# Patient Record
Sex: MALE | Race: OTHER
[De-identification: names, ages, dates, MRNs, and addresses within clinical notes are randomized per-mention and may not be internally consistent; named-entity substitution may affect disease eponyms.]

---

## 2017-04-02 ENCOUNTER — HOSPITAL ENCOUNTER (EMERGENCY)
Dept: HOSPITAL 62 - ER | Age: 37
Discharge: HOME | End: 2017-04-02
Payer: SELF-PAY

## 2017-04-02 VITALS — SYSTOLIC BLOOD PRESSURE: 128 MMHG | DIASTOLIC BLOOD PRESSURE: 82 MMHG

## 2017-04-02 DIAGNOSIS — S91.332A: Primary | ICD-10-CM
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
## 2017-04-02 DIAGNOSIS — F17.200: ICD-10-CM

## 2017-04-02 DIAGNOSIS — L03.116: ICD-10-CM

## 2017-04-02 DIAGNOSIS — M79.89: ICD-10-CM

## 2017-04-02 DIAGNOSIS — M79.672: ICD-10-CM

## 2017-04-02 LAB
ALBUMIN SERPL-MCNC: 4.1 G/DL (ref 3.5–5)
ALP SERPL-CCNC: 75 U/L (ref 38–126)
ALT SERPL-CCNC: 29 U/L (ref 21–72)
ANION GAP SERPL CALC-SCNC: 12 MMOL/L (ref 5–19)
AST SERPL-CCNC: 18 U/L (ref 17–59)
BASOPHILS # BLD AUTO: 0.1 10^3/UL (ref 0–0.2)
BASOPHILS NFR BLD AUTO: 0.8 % (ref 0–2)
BILIRUB DIRECT SERPL-MCNC: 0.3 MG/DL (ref 0–0.4)
BILIRUB SERPL-MCNC: 0.4 MG/DL (ref 0.2–1.3)
BUN SERPL-MCNC: 20 MG/DL (ref 7–20)
CALCIUM: 9.7 MG/DL (ref 8.4–10.2)
CHLORIDE SERPL-SCNC: 105 MMOL/L (ref 98–107)
CO2 SERPL-SCNC: 26 MMOL/L (ref 22–30)
CREAT SERPL-MCNC: 0.84 MG/DL (ref 0.52–1.25)
EOSINOPHIL # BLD AUTO: 0.2 10^3/UL (ref 0–0.6)
EOSINOPHIL NFR BLD AUTO: 2.1 % (ref 0–6)
ERYTHROCYTE [DISTWIDTH] IN BLOOD BY AUTOMATED COUNT: 13.4 % (ref 11.5–14)
GLUCOSE SERPL-MCNC: 95 MG/DL (ref 75–110)
HCT VFR BLD CALC: 43.2 % (ref 37.9–51)
HGB BLD-MCNC: 14.7 G/DL (ref 13.5–17)
HGB HCT DIFFERENCE: 0.9
LYMPHOCYTES # BLD AUTO: 3 10^3/UL (ref 0.5–4.7)
LYMPHOCYTES NFR BLD AUTO: 27.4 % (ref 13–45)
MCH RBC QN AUTO: 30.8 PG (ref 27–33.4)
MCHC RBC AUTO-ENTMCNC: 34 G/DL (ref 32–36)
MCV RBC AUTO: 91 FL (ref 80–97)
MONOCYTES # BLD AUTO: 0.9 10^3/UL (ref 0.1–1.4)
MONOCYTES NFR BLD AUTO: 8.1 % (ref 3–13)
NEUTROPHILS # BLD AUTO: 6.7 10^3/UL (ref 1.7–8.2)
NEUTS SEG NFR BLD AUTO: 61.6 % (ref 42–78)
POTASSIUM SERPL-SCNC: 4.9 MMOL/L (ref 3.6–5)
PROT SERPL-MCNC: 6.9 G/DL (ref 6.3–8.2)
RBC # BLD AUTO: 4.77 10^6/UL (ref 4.35–5.55)
SODIUM SERPL-SCNC: 143.3 MMOL/L (ref 137–145)
WBC # BLD AUTO: 10.9 10^3/UL (ref 4–10.5)

## 2017-04-02 PROCEDURE — 96367 TX/PROPH/DG ADDL SEQ IV INF: CPT

## 2017-04-02 PROCEDURE — 96365 THER/PROPH/DIAG IV INF INIT: CPT

## 2017-04-02 PROCEDURE — 36415 COLL VENOUS BLD VENIPUNCTURE: CPT

## 2017-04-02 PROCEDURE — 0H9NXZZ DRAINAGE OF LEFT FOOT SKIN, EXTERNAL APPROACH: ICD-10-PCS

## 2017-04-02 PROCEDURE — 96375 TX/PRO/DX INJ NEW DRUG ADDON: CPT

## 2017-04-02 PROCEDURE — 90471 IMMUNIZATION ADMIN: CPT

## 2017-04-02 PROCEDURE — 80053 COMPREHEN METABOLIC PANEL: CPT

## 2017-04-02 PROCEDURE — 90715 TDAP VACCINE 7 YRS/> IM: CPT

## 2017-04-02 PROCEDURE — 73630 X-RAY EXAM OF FOOT: CPT

## 2017-04-02 PROCEDURE — 87040 BLOOD CULTURE FOR BACTERIA: CPT

## 2017-04-02 PROCEDURE — 96372 THER/PROPH/DIAG INJ SC/IM: CPT

## 2017-04-02 PROCEDURE — 10060 I&D ABSCESS SIMPLE/SINGLE: CPT

## 2017-04-02 PROCEDURE — 99284 EMERGENCY DEPT VISIT MOD MDM: CPT

## 2017-04-02 PROCEDURE — 85025 COMPLETE CBC W/AUTO DIFF WBC: CPT

## 2017-04-02 NOTE — ER DOCUMENT REPORT
ED Wound





- General


Chief Complaint: Puncture Wound to Foot


Stated Complaint: LEFT FOOT INJURY


Mode of Arrival: Ambulatory


Information source: Patient


Notes: 


Patient states that he stepped on a nail that went through his shoe, through 

his sock into his foot 5 days ago.  Patient states that he pulled the nail out 

of his foot by using the claw of his hammer.  Patient states since then he is 

gradually developed increasing left foot pain, swelling and noticed redness to 

the top of his foot yesterday.  Patient denies any history of diabetes.





- HPI


Patient complains to provider of: Puncture wound


Onset/Duration: Worse


Quality of pain: Achy


Pain Level: 3


Skin Color: Erythema


Associated Symptoms: Redness, Swelling





- Related Data


Allergies/Adverse Reactions: 


 





Sulfa (Sulfonamide Antibiotics) Allergy (Unknown, Verified 04/02/17 02:53)


 











Past Medical History





- General


Information source: Patient





- Social History


Smoking Status: Current Every Day Smoker


Frequency of alcohol use: None


Drug Abuse: None


Occupation: construction


Lives with: Family


Family History: None





- Past Medical History


Cardiac Medical History: Reports: Hx Hypertension


Neurological Medical History: Reports: Hx Seizures


Renal/ Medical History: Denies: Hx Peritoneal Dialysis


Traumatic Medical History: Reports: Hx Fractures - right shoulder, neuropathy sx


Past Surgical History: Reports: Hx Orthopedic Surgery - R shoulder, Hx 

Tonsillectomy





- Immunizations


Hx Diphtheria, Pertussis, Tetanus Vaccination: Yes





Review of Systems





- Review of Systems


Constitutional: No symptoms reported.  denies: Fever, Recent illness


EENT: No symptoms reported


Cardiovascular: No symptoms reported


Respiratory: No symptoms reported


Gastrointestinal: No symptoms reported


Genitourinary: No symptoms reported


Male Genitourinary: No symptoms reported


Musculoskeletal: Other - Left foot pain and swelling


Skin: Change in color - Faint redness to dorsal aspect of left foot


Hematologic/Lymphatic: No symptoms reported


Neurological/Psychological: No symptoms reported





Physical Exam





- Vital signs


Vitals: 


 











Temp Pulse Resp BP Pulse Ox


 


 98.2 F   93   18   144/93 H  100 


 


 04/02/17 02:53  04/02/17 02:53  04/02/17 02:53  04/02/17 02:53  04/02/17 02:53














- General


General appearance: Appears well, Alert


In distress: None





- HEENT


Head: Normocephalic, Atraumatic


Eyes: Normal


Nasal: Normal


Mouth/Lips: Normal


Mucous membranes: Normal


Neck: Normal, Supple.  No: Lymphadenopathy





- Respiratory


Respiratory status: No respiratory distress


Chest status: Nontender


Breath sounds: Normal.  No: Rales, Rhonchi, Stridor, Wheezing


Chest palpation: Normal





- Cardiovascular


Rhythm: Regular


Heart sounds: S1 appreciated, S2 appreciated


Murmur: No





- Back


Back: Normal





- Extremities


General upper extremity: Normal inspection, Normal strength


General lower extremity: Tender - Left foot tenderness, Normal strength


Ankle: Normal, Nontender


Foot: Tender - Tenderness to plantar surface of left foot, Edema, Other - 1+ 

edema to left foot faint erythematous dorsal aspect to left foot overlying left 

fourth and fifth metatarsals


Notes: 


Patient with plantar puncture wound to foot near fourth metatarsal





- Neurological


Neuro grossly intact: Yes


Cognition: Normal


Ace Coma Scale Eye Opening: Spontaneous


Ace Coma Scale Verbal: Oriented


[place] Coma Scale Motor: Obeys Commands


[place] Coma Scale Total: 15





- Psychological


Associated symptoms: Normal affect, Normal mood





- Skin


Skin Temperature: Warm


Skin Moisture: Dry


Skin Color: Erythema - Dorsal aspect of left foot


Irregularity with: Swelling, Tenderness





Course





- Re-evaluation


Re-evalutation: 





04/02/17 07:41


Consulted with Dr. Hidalgo regarding patient management.  Recommends incision, 

exploration of puncture wound to be certain no retained foreign body





- Vital Signs


Vital signs: 


 











Temp Pulse Resp BP Pulse Ox


 


 98.2 F   80   18   128/82 H  98 


 


 04/02/17 11:00  04/02/17 11:00  04/02/17 11:00  04/02/17 11:00  04/02/17 11:00














- Laboratory


Result Diagrams: 


 04/02/17 07:55





 04/02/17 07:55


Laboratory results interpreted by me: 


 











  04/02/17





  07:55


 


WBC  10.9 H











 Labs- Entire Visit











  04/02/17 04/02/17





  07:55 07:55


 


WBC  10.9 H 


 


RBC  4.77 


 


Hgb  14.7 


 


Hct  43.2 


 


MCV  91 


 


MCH  30.8 


 


MCHC  34.0 


 


RDW  13.4 


 


Plt Count  261 


 


Seg Neutrophils %  61.6 


 


Lymphocytes %  27.4 


 


Monocytes %  8.1 


 


Eosinophils %  2.1 


 


Basophils %  0.8 


 


Absolute Neutrophils  6.7 


 


Absolute Lymphocytes  3.0 


 


Absolute Monocytes  0.9 


 


Absolute Eosinophils  0.2 


 


Absolute Basophils  0.1 


 


Sodium   143.3


 


Potassium   4.9


 


Chloride   105


 


Carbon Dioxide   26


 


Anion Gap   12


 


BUN   20


 


Creatinine   0.84


 


Est GFR ( Amer)   > 60


 


Est GFR (Non-Af Amer)   > 60


 


Glucose   95


 


Calcium   9.7


 


Total Bilirubin   0.4


 


Direct Bilirubin   0.3


 


Indirect Bilirubin   Not Reportable


 


Neonat Total Bilirubin   Not Reportable


 


AST   18


 


ALT   29


 


Alkaline Phosphatase   75


 


Total Protein   6.9


 


Albumin   4.1














- Diagnostic Test


Radiology reviewed: Reports reviewed





Procedures





- Incision and Drainage


  ** Left Foot


Type: Simple


Anesthetic type: 1% Lidocaine


Blade size: 11


I&D procedure: Betadine prep applied


Incision Method: Incision made by scalpel


Notes: 





04/02/17 10:12


Puncture wound opened up with scalpel, small amount of dark matter removed from 

wound, no purulent drainage.  Patient tolerated procedure well





Discharge





- Discharge


Clinical Impression: 


Puncture wound of plantar aspect of foot


Qualifiers:


 Encounter type: initial encounter Laterality: left Qualified Code(s): S91.332A 

- Puncture wound without foreign body, left foot, initial encounter





Cellulitis


Qualifiers:


 Site of cellulitis: extremity Site of cellulitis of extremity: lower extremity 

Laterality: left Qualified Code(s): L03.116 - Cellulitis of left lower limb





Condition: Stable


Disposition: HOME, SELF-CARE


Instructions:  Tetanus Immunization Given ([facility]), Prophylactic Antibiotic ([facility]), 

Oral Narcotic Medication ([facility]), Antibiotic Ointment Protection ([facility]), Soap 

Cleansing ([facility]), Cellulitis ([facility]), IV Antibiotics ([facility]), Use of Crutches ([facility])


Additional Instructions: 


Return immediately for any new or worsening symptoms, increased pain, redness, 

fever, or any concerning symptoms





Followup with orthopedic doctor, call their office tomorrow for a follow-up 

appointment





Keep wound covered as it continues to heal


Prescriptions: 


Cephalexin Monohydrate [Keflex 500 mg Capsule] 500 mg PO Q6H 5 Days


Levofloxacin [Levaquin 750 mg Tablet] 750 mg PO DAILY #4 tablet


Oxycodone HCl/Acetaminophen [Percocet 5-325 mg Tablet] 1 tab PO ASDIR PRN #15 

tablet


 PRN Reason: 


Forms:  Return to Work


Referrals: 


SEVERO SHIRLEY MD [ACTIVE STAFF] - Follow up tomorrow


[facility] [Provider Group] - Follow up tomorrow

## 2019-09-28 ENCOUNTER — HOSPITAL ENCOUNTER (EMERGENCY)
Dept: HOSPITAL 62 - ER | Age: 39
Discharge: HOME | End: 2019-09-28
Payer: SELF-PAY

## 2019-09-28 VITALS — SYSTOLIC BLOOD PRESSURE: 156 MMHG | DIASTOLIC BLOOD PRESSURE: 99 MMHG

## 2019-09-28 DIAGNOSIS — S61.235A: Primary | ICD-10-CM

## 2019-09-28 DIAGNOSIS — I10: ICD-10-CM
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
## 2019-09-28 LAB
ADD MANUAL DIFF: NO
ALBUMIN SERPL-MCNC: 4.3 G/DL (ref 3.5–5)
ALP SERPL-CCNC: 63 U/L (ref 38–126)
ANION GAP SERPL CALC-SCNC: 10 MMOL/L (ref 5–19)
AST SERPL-CCNC: 22 U/L (ref 17–59)
BASOPHILS # BLD AUTO: 0.1 10^3/UL (ref 0–0.2)
BASOPHILS NFR BLD AUTO: 1.2 % (ref 0–2)
BILIRUB DIRECT SERPL-MCNC: 0.1 MG/DL (ref 0–0.4)
BILIRUB SERPL-MCNC: 0.3 MG/DL (ref 0.2–1.3)
BUN SERPL-MCNC: 16 MG/DL (ref 7–20)
CALCIUM: 9.6 MG/DL (ref 8.4–10.2)
CHLORIDE SERPL-SCNC: 104 MMOL/L (ref 98–107)
CO2 SERPL-SCNC: 27 MMOL/L (ref 22–30)
EOSINOPHIL # BLD AUTO: 0.1 10^3/UL (ref 0–0.6)
EOSINOPHIL NFR BLD AUTO: 1.8 % (ref 0–6)
ERYTHROCYTE [DISTWIDTH] IN BLOOD BY AUTOMATED COUNT: 13.6 % (ref 11.5–14)
GLUCOSE SERPL-MCNC: 96 MG/DL (ref 75–110)
HCT VFR BLD CALC: 44.3 % (ref 37.9–51)
HGB BLD-MCNC: 15.3 G/DL (ref 13.5–17)
LYMPHOCYTES # BLD AUTO: 2.4 10^3/UL (ref 0.5–4.7)
LYMPHOCYTES NFR BLD AUTO: 31.4 % (ref 13–45)
MCH RBC QN AUTO: 31.2 PG (ref 27–33.4)
MCHC RBC AUTO-ENTMCNC: 34.5 G/DL (ref 32–36)
MCV RBC AUTO: 91 FL (ref 80–97)
MONOCYTES # BLD AUTO: 0.9 10^3/UL (ref 0.1–1.4)
MONOCYTES NFR BLD AUTO: 12.1 % (ref 3–13)
NEUTROPHILS # BLD AUTO: 4 10^3/UL (ref 1.7–8.2)
NEUTS SEG NFR BLD AUTO: 53.5 % (ref 42–78)
PLATELET # BLD: 274 10^3/UL (ref 150–450)
POTASSIUM SERPL-SCNC: 4.4 MMOL/L (ref 3.6–5)
PROT SERPL-MCNC: 7.4 G/DL (ref 6.3–8.2)
RBC # BLD AUTO: 4.9 10^6/UL (ref 4.35–5.55)
TOTAL CELLS COUNTED % (AUTO): 100 %
WBC # BLD AUTO: 7.6 10^3/UL (ref 4–10.5)

## 2019-09-28 PROCEDURE — 86701 HIV-1ANTIBODY: CPT

## 2019-09-28 PROCEDURE — 80074 ACUTE HEPATITIS PANEL: CPT

## 2019-09-28 PROCEDURE — 36415 COLL VENOUS BLD VENIPUNCTURE: CPT

## 2019-09-28 PROCEDURE — 90371 HEP B IG IM: CPT

## 2019-09-28 PROCEDURE — 90746 HEPB VACCINE 3 DOSE ADULT IM: CPT

## 2019-09-28 PROCEDURE — 80053 COMPREHEN METABOLIC PANEL: CPT

## 2019-09-28 PROCEDURE — 96374 THER/PROPH/DIAG INJ IV PUSH: CPT

## 2019-09-28 PROCEDURE — 99283 EMERGENCY DEPT VISIT LOW MDM: CPT

## 2019-09-28 PROCEDURE — 85025 COMPLETE CBC W/AUTO DIFF WBC: CPT

## 2019-09-28 NOTE — ER DOCUMENT REPORT
HPI





- HPI


Time Seen by Provider: 09/28/19 15:01


Pain Level: Denies


Context: 





Patient is a 38-year-old male presents emergency department with a chief 

complaint of needlestick.  Patient states he was helping a friend clean out a 

trailer has a tendency to just moved out when he was taking out the trash and 

was stuck with a needle to the left fourth finger.  Patient reports that his 

hepatitis vaccine is not up-to-date but would like to be treated for HIV and 

hepatitis B.  Patient denies any other complaints.





Past Medical History





- General


Information source: Patient





- Social History


Smoking Status: Unknown if Ever Smoked


Frequency of alcohol use: None


Drug Abuse: None


Lives with: Family


Family History: None


Patient has suicidal ideation: No


Patient has homicidal ideation: No





- Past Medical History


Cardiac Medical History: Reports: Hx Hypertension


Pulmonary Medical History: Reports: None


EENT Medical History: Reports: None


Neurological Medical History: Reports: Hx Seizures


Endocrine Medical History: Reports: None


Renal/ Medical History: Reports: None.  Denies: Hx Peritoneal Dialysis


Malignancy Medical History: Reports None


GI Medical History: Reports: None


Musculoskeletal Medical History: Reports None


Skin Medical History: Reports None


Psychiatric Medical History: Reports: None


Traumatic Medical History: Reports: Hx Fractures - right shoulder, neuropathy sx


Infectious Medical History: Reports: None


Past Surgical History: Reports: Hx Orthopedic Surgery - R shoulder, Hx 

Tonsillectomy





- Immunizations


Hx Diphtheria, Pertussis, Tetanus Vaccination: Yes





Vertical Provider Document





- CONSTITUTIONAL


Agree With Documented VS: Yes


Exam Limitations: No Limitations


General Appearance: No Apparent Distress





- HEENT


HEENT: Atraumatic, Normocephalic, PERRLA





- NECK


Neck: Normal Inspection





- RESPIRATORY


Respiratory: Breath Sounds Normal, No Respiratory Distress





- CARDIOVASCULAR


Cardiovascular: Regular Rate, Regular Rhythm





- GI/ABDOMEN


Gastrointestinal: Abdomen Soft, Abdomen Non-Tender





- NEURO


Level of Consciousness: Awake, Alert, Appropriate





- DERM


Integumentary: Warm, Dry, No Rash





Course





- Re-evaluation


Re-evalutation: 





09/28/19 19:29


Hepatitis panel and medications have been probably ordered.  Patient giving 

first dose of antivirals for 3 days.  Will prescribe a 25-day dose of both 

these.  I did explain to the patient he is to follow-up with the health 

department for additional HIV testing and to receive the rest of the hep B vacci

garcia.  Patient verbalized understanding.





- Vital Signs


Vital signs: 


                                        











Temp Pulse Resp BP Pulse Ox


 


 98.1 F   97   14   156/99 H  97 


 


 09/28/19 14:27  09/28/19 14:27  09/28/19 14:27  09/28/19 14:27  09/28/19 14:27














- Laboratory


Result Diagrams: 


                                 09/28/19 15:30





                                 09/28/19 15:30





Discharge





- Discharge


Clinical Impression: 


Needle stick injury of finger


Qualifiers:


 Encounter type: initial encounter Qualified Code(s): S61.239A - Puncture wound 

without foreign body of unspecified finger without damage to nail, initial 

encounter; W27.3XXA - Contact with needle (sewing), initial encounter





Condition: Stable


Disposition: HOME, SELF-CARE


Additional Instructions: 


Today you are seen in the emergency department for needlestick exposure.  You 

have been tested for HIV and hepatitis B.  We have given you the hepatitis B 

vaccine and immunoglobulin but you will require additional doses.  You can go to

the health department for further follow-up.  You also been given a 28-day 

course of antivirals.  You do need to follow-up with them to have additional HIV

testing over the course of a specific time.  Please return to the emergency 

department if you have any concerning signs or symptoms.


Prescriptions: 


Raltegravir Potassium [Isentress 400 mg Tablet] 400 mg PO BID 25 Days #50 tablet


Emtricitabine/Tenofovir (Tdf) [Truvada 100 mg-150 mg Tablet] 2 tab PO DAILY 25 

Days #50 tablet

## 2019-09-28 NOTE — ER DOCUMENT REPORT
ED Medical Screen (RME)





- General


Chief Complaint: Needle Stick Exposure


Stated Complaint: NEEDLE STICK


Time Seen by Provider: 09/28/19 15:01


Mode of Arrival: Ambulatory


Information source: Patient


Notes: 





Patient reports needlestick exposure to the left fourth finger.  Patient was 

cleaning out with a trailer for a friend after attendance moved out.  Patient 

does not know any information about whose needle it may be.  Patient denies any 

history of hepatitis B vaccination.  Patient would like to be tested and treated

for HIV postexposure.  Patient does report his tetanus immunizations up-to-date.





I have greeted and performed a rapid initial assessment of this patient.  A 

comprehensive ED assessment and evaluation of the patient, analysis of test 

results and completion of the medical decision making process will be conducted 

by additional ED providers.





- Related Data


Allergies/Adverse Reactions: 


                                        





Sulfa (Sulfonamide Antibiotics) Allergy (Unknown, Verified 09/28/19 14:58)


   











Past Medical History





- Past Medical History


Cardiac Medical History: Reports: Hx Hypertension


Neurological Medical History: Reports: Hx Seizures


Renal/ Medical History: Denies: Hx Peritoneal Dialysis


Traumatic Medical History: Reports: Hx Fractures - right shoulder, neuropathy sx


Past Surgical History: Reports: Hx Orthopedic Surgery - R shoulder, Hx 

Tonsillectomy





- Immunizations


Hx Diphtheria, Pertussis, Tetanus Vaccination: Yes





Physical Exam





- Vital signs


Vitals: 





                                        











Temp Pulse Resp BP Pulse Ox


 


 98.1 F   97   14   156/99 H  97 


 


 09/28/19 14:27 09/28/19 14:27 09/28/19 14:27 09/28/19 14:27 09/28/19 14:27














- General


General appearance: Alert, Anxious


Notes: 





Left fourth finger puncture wound





Course





- Vital Signs


Vital signs: 





                                        











Temp Pulse Resp BP Pulse Ox


 


 98.1 F   97   14   156/99 H  97 


 


 09/28/19 14:27  09/28/19 14:27  09/28/19 14:27 09/28/19 14:27 09/28/19 14:27

## 2019-09-30 LAB — HEPATITIS C VIRUS ANTIBODY: <0.1 S/CO RATIO (ref 0–0.9)

## 2020-08-28 ENCOUNTER — HOSPITAL ENCOUNTER (EMERGENCY)
Dept: HOSPITAL 62 - ER | Age: 40
Discharge: LEFT BEFORE DISCHARGE | End: 2020-08-28
Payer: SELF-PAY

## 2020-08-28 VITALS — DIASTOLIC BLOOD PRESSURE: 90 MMHG | SYSTOLIC BLOOD PRESSURE: 145 MMHG

## 2020-08-28 DIAGNOSIS — N43.3: ICD-10-CM

## 2020-08-28 DIAGNOSIS — R10.30: ICD-10-CM

## 2020-08-28 DIAGNOSIS — N50.82: Primary | ICD-10-CM

## 2020-08-28 DIAGNOSIS — Z88.2: ICD-10-CM

## 2020-08-28 DIAGNOSIS — I86.1: ICD-10-CM

## 2020-08-28 DIAGNOSIS — I10: ICD-10-CM

## 2020-08-28 DIAGNOSIS — Z53.20: ICD-10-CM

## 2020-08-28 LAB
ADD MANUAL DIFF: NO
ALBUMIN SERPL-MCNC: 4.2 G/DL (ref 3.5–5)
ALP SERPL-CCNC: 65 U/L (ref 38–126)
ANION GAP SERPL CALC-SCNC: 8 MMOL/L (ref 5–19)
APPEARANCE UR: CLEAR
APTT PPP: (no result) S
AST SERPL-CCNC: 20 U/L (ref 17–59)
BASOPHILS # BLD AUTO: 0.1 10^3/UL (ref 0–0.2)
BASOPHILS NFR BLD AUTO: 1.1 % (ref 0–2)
BILIRUB DIRECT SERPL-MCNC: 0 MG/DL (ref 0–0.4)
BILIRUB SERPL-MCNC: 0.5 MG/DL (ref 0.2–1.3)
BILIRUB UR QL STRIP: NEGATIVE
BUN SERPL-MCNC: 13 MG/DL (ref 7–20)
CALCIUM: 9.6 MG/DL (ref 8.4–10.2)
CHLAM PCR: NOT DETECTED
CHLORIDE SERPL-SCNC: 109 MMOL/L (ref 98–107)
CO2 SERPL-SCNC: 23 MMOL/L (ref 22–30)
EOSINOPHIL # BLD AUTO: 0.1 10^3/UL (ref 0–0.6)
EOSINOPHIL NFR BLD AUTO: 0.9 % (ref 0–6)
ERYTHROCYTE [DISTWIDTH] IN BLOOD BY AUTOMATED COUNT: 13.1 % (ref 11.5–14)
GLUCOSE SERPL-MCNC: 92 MG/DL (ref 75–110)
GLUCOSE UR STRIP-MCNC: NEGATIVE MG/DL
HCT VFR BLD CALC: 44.8 % (ref 37.9–51)
HGB BLD-MCNC: 15.6 G/DL (ref 13.5–17)
KETONES UR STRIP-MCNC: NEGATIVE MG/DL
LYMPHOCYTES # BLD AUTO: 3 10^3/UL (ref 0.5–4.7)
LYMPHOCYTES NFR BLD AUTO: 31.7 % (ref 13–45)
MCH RBC QN AUTO: 31.4 PG (ref 27–33.4)
MCHC RBC AUTO-ENTMCNC: 34.9 G/DL (ref 32–36)
MCV RBC AUTO: 90 FL (ref 80–97)
MONOCYTES # BLD AUTO: 0.9 10^3/UL (ref 0.1–1.4)
MONOCYTES NFR BLD AUTO: 9.7 % (ref 3–13)
NEUTROPHILS # BLD AUTO: 5.3 10^3/UL (ref 1.7–8.2)
NEUTS SEG NFR BLD AUTO: 56.6 % (ref 42–78)
NITRITE UR QL STRIP: POSITIVE
PH UR STRIP: 6 [PH] (ref 5–9)
PLATELET # BLD: 284 10^3/UL (ref 150–450)
POTASSIUM SERPL-SCNC: 3.9 MMOL/L (ref 3.6–5)
PROT SERPL-MCNC: 7.1 G/DL (ref 6.3–8.2)
PROT UR STRIP-MCNC: 100 MG/DL
RBC # BLD AUTO: 4.97 10^6/UL (ref 4.35–5.55)
SP GR UR STRIP: 1.03
TOTAL CELLS COUNTED % (AUTO): 100 %
UROBILINOGEN UR-MCNC: 4 MG/DL (ref ?–2)
WBC # BLD AUTO: 9.4 10^3/UL (ref 4–10.5)

## 2020-08-28 PROCEDURE — 36415 COLL VENOUS BLD VENIPUNCTURE: CPT

## 2020-08-28 PROCEDURE — 93976 VASCULAR STUDY: CPT

## 2020-08-28 PROCEDURE — 81001 URINALYSIS AUTO W/SCOPE: CPT

## 2020-08-28 PROCEDURE — 87086 URINE CULTURE/COLONY COUNT: CPT

## 2020-08-28 PROCEDURE — 80053 COMPREHEN METABOLIC PANEL: CPT

## 2020-08-28 PROCEDURE — 87591 N.GONORRHOEAE DNA AMP PROB: CPT

## 2020-08-28 PROCEDURE — 76870 US EXAM SCROTUM: CPT

## 2020-08-28 PROCEDURE — 87491 CHLMYD TRACH DNA AMP PROBE: CPT

## 2020-08-28 PROCEDURE — 85025 COMPLETE CBC W/AUTO DIFF WBC: CPT

## 2020-08-28 PROCEDURE — 99281 EMR DPT VST MAYX REQ PHY/QHP: CPT

## 2020-08-28 NOTE — ER DOCUMENT REPORT
ED Medical Screen (RME)





- General


Chief Complaint: Testicular Pain


Stated Complaint: TESTICULAR PAIN


Time Seen by Provider: 08/28/20 17:17


Mode of Arrival: Ambulatory


Information source: Patient


Notes: 





39-year-old male presented to ED for complaint of right scrotal groin and 

abdominal pain.  He states he has had it for months but is much worse today than

it has ever been.  He states he can feel swelling in the scrotum and the groin 

area.  I did examine the man in the triage room in the presence of Kristie.  I did not appreciate any obvious swelling to the scrotum or pelvic area.  I

have notified ultrasound that you need a ultrasound status he is complaining of 

severe excruciating pain in this area.  We will get blood and urine after his 

ultrasound.

















I have greeted and performed a rapid initial assessment of this patient.  A 

comprehensive ED assessment and evaluation of the patient, analysis of test 

results and completion of medical decision making process will be conducted by 

an additional ED providers.





- Related Data


Allergies/Adverse Reactions: 


                                        





Sulfa (Sulfonamide Antibiotics) Allergy (Unknown, Verified 09/28/19 14:58)


   











Past Medical History





- Past Medical History


Cardiac Medical History: Reports: Hx Hypertension


Neurological Medical History: Reports: Hx Seizures


Renal/ Medical History: Denies: Hx Peritoneal Dialysis


Traumatic Medical History: Reports: Hx Fractures - right shoulder, neuropathy sx


Past Surgical History: Reports: Hx Orthopedic Surgery - R shoulder, Hx 

Tonsillectomy





- Immunizations


Hx Diphtheria, Pertussis, Tetanus Vaccination: Yes





Physical Exam





- Vital signs


Vitals: 





                                        











Temp Pulse Resp BP Pulse Ox


 


 97.9 F   103 H  20   145/90 H  98 


 


 08/28/20 17:05  08/28/20 17:05  08/28/20 17:05  08/28/20 17:05  08/28/20 17:05














Course





- Vital Signs


Vital signs: 





                                        











Temp Pulse Resp BP Pulse Ox


 


 97.9 F   103 H  20   145/90 H  98 


 


 08/28/20 17:05  08/28/20 17:05  08/28/20 17:05  08/28/20 17:05  08/28/20 17:05

## 2020-08-28 NOTE — RADIOLOGY REPORT (SQ)
EXAM DESCRIPTION:  U/S SCROTUM W/DOPPLER



IMAGES COMPLETED DATE/TIME:  8/28/2020 6:07 pm



REASON FOR STUDY:  Right scrotal and pelvic pain



COMPARISON:  None.



TECHNIQUE:  Static and realtime gray scale imaging of the scrotum and testes.  Selected color Doppler
 and spectral images recorded to document blood flow.



LIMITATIONS:  None.



FINDINGS:  RIGHT:

TESTICLE: Normal size 4.3 cm. Normal echotexture.  Normal blood flow.  No mass.

EPIDIDYMIS: Normal, 7 mm.

HYDROCELE OR VARICOCELE: 3.3 mm varicocele.  Moderate hydrocele.

HERNIA OR EXTRA-TESTICULAR MASS: No.

OTHER: No other significant finding.

LEFT:

TESTICLE: Normal size, 3.1 cm. Normal echotexture.  Normal blood flow.  No mass.

EPIDIDYMIS: Normal, 10 mm.

HYDROCELE OR VARICOCELE: 3 mm varicocele

HERNIA OR EXTRA-TESTICULAR MASS: No.

OTHER: No other significant finding.



IMPRESSION:  Bilateral varicoceles.  Moderate right hydrocele.  Normal testes with blood flow to each
.



TECHNICAL DOCUMENTATION:  JOB ID:  [id number]

 [employer]- All Rights Reserved



Reading location - IP/workstation name: [email or web address]

## 2020-11-22 ENCOUNTER — HOSPITAL ENCOUNTER (EMERGENCY)
Dept: HOSPITAL 62 - ER | Age: 40
LOS: 1 days | Discharge: HOME | End: 2020-11-23
Payer: COMMERCIAL

## 2020-11-22 VITALS — DIASTOLIC BLOOD PRESSURE: 91 MMHG | SYSTOLIC BLOOD PRESSURE: 135 MMHG

## 2020-11-22 DIAGNOSIS — Z88.2: ICD-10-CM

## 2020-11-22 DIAGNOSIS — R00.2: Primary | ICD-10-CM

## 2020-11-22 DIAGNOSIS — R03.0: ICD-10-CM

## 2020-11-22 DIAGNOSIS — F17.210: ICD-10-CM

## 2020-11-22 LAB
ADD MANUAL DIFF: NO
ALBUMIN SERPL-MCNC: 4.1 G/DL (ref 3.5–5)
ALP SERPL-CCNC: 60 U/L (ref 38–126)
ANION GAP SERPL CALC-SCNC: 9 MMOL/L (ref 5–19)
AST SERPL-CCNC: 22 U/L (ref 17–59)
BASOPHILS # BLD AUTO: 0 10^3/UL (ref 0–0.2)
BASOPHILS NFR BLD AUTO: 0.6 % (ref 0–2)
BILIRUB DIRECT SERPL-MCNC: 0.1 MG/DL (ref 0–0.4)
BILIRUB SERPL-MCNC: 0.6 MG/DL (ref 0.2–1.3)
BUN SERPL-MCNC: 12 MG/DL (ref 7–20)
CALCIUM: 10.8 MG/DL (ref 8.4–10.2)
CHLORIDE SERPL-SCNC: 103 MMOL/L (ref 98–107)
CO2 SERPL-SCNC: 28 MMOL/L (ref 22–30)
EOSINOPHIL # BLD AUTO: 0.1 10^3/UL (ref 0–0.6)
EOSINOPHIL NFR BLD AUTO: 1.4 % (ref 0–6)
ERYTHROCYTE [DISTWIDTH] IN BLOOD BY AUTOMATED COUNT: 13.6 % (ref 11.5–14)
GLUCOSE SERPL-MCNC: 92 MG/DL (ref 75–110)
HCT VFR BLD CALC: 43.2 % (ref 37.9–51)
HGB BLD-MCNC: 15.1 G/DL (ref 13.5–17)
LYMPHOCYTES # BLD AUTO: 2.4 10^3/UL (ref 0.5–4.7)
LYMPHOCYTES NFR BLD AUTO: 36.7 % (ref 13–45)
MCH RBC QN AUTO: 31.5 PG (ref 27–33.4)
MCHC RBC AUTO-ENTMCNC: 35 G/DL (ref 32–36)
MCV RBC AUTO: 90 FL (ref 80–97)
MONOCYTES # BLD AUTO: 0.7 10^3/UL (ref 0.1–1.4)
MONOCYTES NFR BLD AUTO: 11.1 % (ref 3–13)
NEUTROPHILS # BLD AUTO: 3.2 10^3/UL (ref 1.7–8.2)
NEUTS SEG NFR BLD AUTO: 50.2 % (ref 42–78)
PLATELET # BLD: 269 10^3/UL (ref 150–450)
POTASSIUM SERPL-SCNC: 4.8 MMOL/L (ref 3.6–5)
PROT SERPL-MCNC: 6.8 G/DL (ref 6.3–8.2)
RBC # BLD AUTO: 4.8 10^6/UL (ref 4.35–5.55)
TOTAL CELLS COUNTED % (AUTO): 100 %
WBC # BLD AUTO: 6.5 10^3/UL (ref 4–10.5)

## 2020-11-22 PROCEDURE — 80053 COMPREHEN METABOLIC PANEL: CPT

## 2020-11-22 PROCEDURE — 93005 ELECTROCARDIOGRAM TRACING: CPT

## 2020-11-22 PROCEDURE — 71045 X-RAY EXAM CHEST 1 VIEW: CPT

## 2020-11-22 PROCEDURE — 85025 COMPLETE CBC W/AUTO DIFF WBC: CPT

## 2020-11-22 PROCEDURE — 84484 ASSAY OF TROPONIN QUANT: CPT

## 2020-11-22 PROCEDURE — 99285 EMERGENCY DEPT VISIT HI MDM: CPT

## 2020-11-22 PROCEDURE — 93010 ELECTROCARDIOGRAM REPORT: CPT

## 2020-11-22 PROCEDURE — 36415 COLL VENOUS BLD VENIPUNCTURE: CPT

## 2020-11-22 NOTE — RADIOLOGY REPORT (SQ)
EXAM DESCRIPTION:

Site:  CHEST SINGLE VIEW

RP: XR CHEST 1 VIEW 



CLINICAL HISTORY:

40 years  Male;  chest pain;



COMPARISON: None.



FINDINGS:

Lungs: Lungs are clear, with no focal infiltrate, pneumothorax,

or pleural effusion. 

Mediastinum: Mediastinum is within normal limits for this

positioning.

Bones: Bony structures are unremarkable.



IMPRESSION:

1.  No acute pulmonary findings.

## 2020-11-22 NOTE — ER DOCUMENT REPORT
ED Medical Screen (RME)





- General


Stated Complaint: CHEST PAIN AND SHORTNESS OF BREATH


Time Seen by Provider: 11/22/20 20:12





- HPI


Notes: 





Patient is a 40-year-old male with no medical history who presents with chest 

pain that began last night.  Patient describes the pain as a "stuttering" to the

right side of his chest.  He reports shortness of breath and cough but denies 

fever, abdominal pain, and vomiting.  Patient is a current everyday smoker.





- Related Data


Allergies/Adverse Reactions: 


                                        





Sulfa (Sulfonamide Antibiotics) Allergy (Unknown, Verified 09/28/19 14:58)


   











Past Medical History





- Past Medical History


Cardiac Medical History: Reports: Hx Hypertension


Neurological Medical History: Reports: Hx Seizures


Renal/ Medical History: Denies: Hx Peritoneal Dialysis


Traumatic Medical History: Reports: Hx Fractures - right shoulder, neuropathy sx


Past Surgical History: Reports: Hx Orthopedic Surgery - R shoulder, Hx 

Tonsillectomy





- Immunizations


Hx Diphtheria, Pertussis, Tetanus Vaccination: Yes





Physical Exam





- Vital signs


Vitals: 





                                        











Temp Pulse Resp BP Pulse Ox


 


 97.6 F   80   18   135/91 H  99 


 


 11/22/20 19:53  11/22/20 19:53  11/22/20 19:53  11/22/20 19:53  11/22/20 19:53














- Respiratory


Respiratory status: No respiratory distress


Breath sounds: Normal





- Cardiovascular


Rhythm: Regular


Heart sounds: Normal auscultation





Course





- Re-evaluation


Re-evalutation: 





I have greeted and performed a rapid initial assessment of this patient.  A 

comprehensive ED assessment and evaluation of the patient, analysis of test 

results and completion of medical decision making process will be conducted by 

an additional ED providers.








- Vital Signs


Vital signs: 





                                        











Temp Pulse Resp BP Pulse Ox


 


 97.6 F   80   18   135/91 H  99 


 


 11/22/20 19:53  11/22/20 19:53  11/22/20 19:53  11/22/20 19:53  11/22/20 19:53

## 2020-11-23 NOTE — ER DOCUMENT REPORT
ED General





- General


Chief Complaint: Chest Pain


Stated Complaint: CHEST PAIN AND SHORTNESS OF BREATH


Time Seen by Provider: 11/22/20 20:12


Mode of Arrival: Ambulatory


Information source: Patient


Notes: 





Patient is a 40-year-old  male coming in today with a fluttery sensation

in the right side of his chest.  He had the same thing about a week ago.  It 

returned today.  Often times happens while he is in bed.  He does not describe a

pressure or a pain.  He does not describe any shortness of breath.  Sensation 

does not radiate.  No dizziness or sweatiness associated.  No known history of 

hypertension, hyperlipidemia, diabetes, or family history.  Patient does smoke 

cigarettes.





- Related Data


Allergies/Adverse Reactions: 


                                        





Sulfa (Sulfonamide Antibiotics) Allergy (Unknown, Verified 09/28/19 14:58)


   











Past Medical History





- Social History


Smoking Status: Current Every Day Smoker


Family History: None





- Past Medical History


Cardiac Medical History: Reports: Hx Hypertension


Neurological Medical History: Reports: Hx Seizures


Renal/ Medical History: Denies: Hx Peritoneal Dialysis


Traumatic Medical History: Reports: Hx Fractures - right shoulder, neuropathy sx


Past Surgical History: Reports: Hx Orthopedic Surgery - R shoulder, Hx 

Tonsillectomy





- Immunizations


Hx Diphtheria, Pertussis, Tetanus Vaccination: Yes





Review of Systems





- Review of Systems


Notes: 





Constitutional:  No fevers. No chills.





EENT: No eye redness. No eye pain. No ear pain. No sore throat.





Cardiovascular:  No chest pain. +palpitations.





Respiratory: No cough. No shortness of breath. No respiratory distress.





Gastrointestinal: No abdominal pain. No nausea, vomiting, or diarrhea.





Genitourinary: Atraumatic. No lesions. No pain. No discharge.





Musculoskeletal: Atraumatic. No swelling. No deformities.





Skin: No rash or lesions.





Lymphatic: No swollen lymph nodes.





Neurologic: No headache. No syncope.





Psychiatric: No suicidal or homicidal ideation.





Physical Exam





- Vital signs


Vitals: 


                                        











Temp Pulse Resp BP Pulse Ox


 


 97.6 F   80   18   135/91 H  99 


 


 11/22/20 19:53  11/22/20 19:53  11/22/20 19:53  11/22/20 19:53  11/22/20 19:53














- Notes


Notes: 





General: Well-developed, well-nourished. In no acute distress. Non-toxic 

appearing.





Cardiac: Well-perfused. Regular rate and rhythm. No murmurs, rubs, or gallops.  

No peripheral edema





Pulmonary: No respiratory distress. No cyanosis. Bilateral lung fields are clear

to auscultation.





Abdominal: Non-distended. Non-rigid. Bowels sounds are present in all four 

quadrants. No guarding or rebound.





HEENT: Head is atraumatic. Conjunctivae not reddened. No tearing. PERRL. EOMI. 

Orbits atraumatic. No periorbital swelling or erythema. Oropharynx is without 

erythema, swelling, or exudates.





Neck: Supple. No adenopathy. No meningismus.





Dermatologic: Warm with good turgor. No rash. Atraumatic.





Chest: Atraumatic. No chest wall tenderness to palpation.





Musculoskeletal: Moves all extremities well. No range of motion deficits. no 

muscular or joint tenderness. No paraspinal muscle tenderness. no midline spinal

tenderness or step-off.





Genitourinary: Examination deferred





Neurologic: No gross neurologic deficits.





Psychiatric: Normal mood. 











Course





- Re-evaluation


Re-evalutation: 





11/23/20 00:34


Patient reports history consistent with palpitations.  Low risk for 

cardiovascular disease.  Heart score of 1.


11/23/20 02:25


Second troponin is negative.  Will discharge referral to cardiology for Holter 

monitor.





- Vital Signs


Vital signs: 


                                        











Temp Pulse Resp BP Pulse Ox


 


 97.6 F   80   18   135/91 H  99 


 


 11/22/20 19:53  11/22/20 19:53  11/22/20 19:53  11/22/20 19:53  11/22/20 19:53














- Laboratory


Result Diagrams: 


                                 11/22/20 20:52





                                 11/22/20 20:52


Laboratory results interpreted by me: 


                                        











  11/22/20





  20:52


 


Calcium  10.8 H














- EKG Interpretation by Me


EKG shows normal: Sinus rhythm, Axis, Intervals, QRS Complexes, ST-T Waves


Rate: Normal


Rhythm: NSR





Discharge





- Discharge


Clinical Impression: 


 Elevated blood pressure reading, Palpitations





Condition: Good


Disposition: HOME, SELF-CARE


Instructions:  Palpitations (Irregular or Rapid Heartrate) (OM)


Additional Instructions: 


Call the assigned cardiologist to get follow-up care.


Forms:  Elevated Blood Pressure


Referrals: 


LESLIE CONNOLLY MD [ACTIVE PROVISIONAL STAFF] - Follow up as needed